# Patient Record
Sex: FEMALE | Race: WHITE | NOT HISPANIC OR LATINO | Employment: FULL TIME | URBAN - METROPOLITAN AREA
[De-identification: names, ages, dates, MRNs, and addresses within clinical notes are randomized per-mention and may not be internally consistent; named-entity substitution may affect disease eponyms.]

---

## 2017-10-04 ENCOUNTER — APPOINTMENT (OUTPATIENT)
Dept: MEDICAL GROUP | Facility: PHYSICIAN GROUP | Age: 27
End: 2017-10-04

## 2020-03-03 ENCOUNTER — HOSPITAL ENCOUNTER (EMERGENCY)
Facility: MEDICAL CENTER | Age: 30
End: 2020-03-03
Attending: EMERGENCY MEDICINE
Payer: COMMERCIAL

## 2020-03-03 ENCOUNTER — APPOINTMENT (OUTPATIENT)
Dept: RADIOLOGY | Facility: MEDICAL CENTER | Age: 30
End: 2020-03-03
Attending: EMERGENCY MEDICINE
Payer: COMMERCIAL

## 2020-03-03 VITALS
HEART RATE: 70 BPM | TEMPERATURE: 97.9 F | DIASTOLIC BLOOD PRESSURE: 65 MMHG | OXYGEN SATURATION: 98 % | SYSTOLIC BLOOD PRESSURE: 105 MMHG | HEIGHT: 68 IN | BODY MASS INDEX: 24.32 KG/M2 | WEIGHT: 160.5 LBS | RESPIRATION RATE: 18 BRPM

## 2020-03-03 DIAGNOSIS — K59.00 CONSTIPATION, UNSPECIFIED CONSTIPATION TYPE: ICD-10-CM

## 2020-03-03 DIAGNOSIS — R10.31 RIGHT LOWER QUADRANT ABDOMINAL PAIN: ICD-10-CM

## 2020-03-03 DIAGNOSIS — N83.201 CYST OF RIGHT OVARY: ICD-10-CM

## 2020-03-03 LAB
ALBUMIN SERPL BCP-MCNC: 4.3 G/DL (ref 3.2–4.9)
ALBUMIN/GLOB SERPL: 1.5 G/DL
ALP SERPL-CCNC: 35 U/L (ref 30–99)
ALT SERPL-CCNC: 11 U/L (ref 2–50)
ANION GAP SERPL CALC-SCNC: 11 MMOL/L (ref 7–16)
AST SERPL-CCNC: 17 U/L (ref 12–45)
BASOPHILS # BLD AUTO: 0.6 % (ref 0–1.8)
BASOPHILS # BLD: 0.05 K/UL (ref 0–0.12)
BILIRUB SERPL-MCNC: 0.4 MG/DL (ref 0.1–1.5)
BUN SERPL-MCNC: 16 MG/DL (ref 8–22)
CALCIUM SERPL-MCNC: 9.5 MG/DL (ref 8.4–10.2)
CHLORIDE SERPL-SCNC: 104 MMOL/L (ref 96–112)
CO2 SERPL-SCNC: 23 MMOL/L (ref 20–33)
CREAT SERPL-MCNC: 0.79 MG/DL (ref 0.5–1.4)
EOSINOPHIL # BLD AUTO: 0.25 K/UL (ref 0–0.51)
EOSINOPHIL NFR BLD: 2.9 % (ref 0–6.9)
ERYTHROCYTE [DISTWIDTH] IN BLOOD BY AUTOMATED COUNT: 41.9 FL (ref 35.9–50)
GLOBULIN SER CALC-MCNC: 2.8 G/DL (ref 1.9–3.5)
GLUCOSE SERPL-MCNC: 118 MG/DL (ref 65–99)
HCG SERPL QL: NEGATIVE
HCT VFR BLD AUTO: 37.9 % (ref 37–47)
HGB BLD-MCNC: 13.1 G/DL (ref 12–16)
IMM GRANULOCYTES # BLD AUTO: 0.02 K/UL (ref 0–0.11)
IMM GRANULOCYTES NFR BLD AUTO: 0.2 % (ref 0–0.9)
LACTATE BLD-SCNC: 1.2 MMOL/L (ref 0.5–2)
LIPASE SERPL-CCNC: 22 U/L (ref 7–58)
LYMPHOCYTES # BLD AUTO: 1.51 K/UL (ref 1–4.8)
LYMPHOCYTES NFR BLD: 17.2 % (ref 22–41)
MCH RBC QN AUTO: 31.6 PG (ref 27–33)
MCHC RBC AUTO-ENTMCNC: 34.6 G/DL (ref 33.6–35)
MCV RBC AUTO: 91.5 FL (ref 81.4–97.8)
MONOCYTES # BLD AUTO: 0.81 K/UL (ref 0–0.85)
MONOCYTES NFR BLD AUTO: 9.2 % (ref 0–13.4)
NEUTROPHILS # BLD AUTO: 6.12 K/UL (ref 2–7.15)
NEUTROPHILS NFR BLD: 69.9 % (ref 44–72)
NRBC # BLD AUTO: 0 K/UL
NRBC BLD-RTO: 0 /100 WBC
PLATELET # BLD AUTO: 267 K/UL (ref 164–446)
PMV BLD AUTO: 9.7 FL (ref 9–12.9)
POTASSIUM SERPL-SCNC: 3.8 MMOL/L (ref 3.6–5.5)
PROT SERPL-MCNC: 7.1 G/DL (ref 6–8.2)
RBC # BLD AUTO: 4.14 M/UL (ref 4.2–5.4)
SODIUM SERPL-SCNC: 138 MMOL/L (ref 135–145)
WBC # BLD AUTO: 8.8 K/UL (ref 4.8–10.8)

## 2020-03-03 PROCEDURE — 36415 COLL VENOUS BLD VENIPUNCTURE: CPT

## 2020-03-03 PROCEDURE — 83605 ASSAY OF LACTIC ACID: CPT

## 2020-03-03 PROCEDURE — 80053 COMPREHEN METABOLIC PANEL: CPT

## 2020-03-03 PROCEDURE — 74177 CT ABD & PELVIS W/CONTRAST: CPT

## 2020-03-03 PROCEDURE — 700111 HCHG RX REV CODE 636 W/ 250 OVERRIDE (IP): Performed by: EMERGENCY MEDICINE

## 2020-03-03 PROCEDURE — 99285 EMERGENCY DEPT VISIT HI MDM: CPT

## 2020-03-03 PROCEDURE — 83690 ASSAY OF LIPASE: CPT

## 2020-03-03 PROCEDURE — 76856 US EXAM PELVIC COMPLETE: CPT

## 2020-03-03 PROCEDURE — 85025 COMPLETE CBC W/AUTO DIFF WBC: CPT

## 2020-03-03 PROCEDURE — 700117 HCHG RX CONTRAST REV CODE 255: Performed by: EMERGENCY MEDICINE

## 2020-03-03 PROCEDURE — 96375 TX/PRO/DX INJ NEW DRUG ADDON: CPT

## 2020-03-03 PROCEDURE — 96374 THER/PROPH/DIAG INJ IV PUSH: CPT

## 2020-03-03 PROCEDURE — 84703 CHORIONIC GONADOTROPIN ASSAY: CPT

## 2020-03-03 RX ORDER — IBUPROFEN 200 MG
400 TABLET ORAL EVERY 6 HOURS PRN
COMMUNITY

## 2020-03-03 RX ORDER — MELOXICAM 7.5 MG/1
7.5 TABLET ORAL DAILY
Qty: 30 TAB | Refills: 0 | Status: SHIPPED | OUTPATIENT
Start: 2020-03-03

## 2020-03-03 RX ORDER — KETOROLAC TROMETHAMINE 30 MG/ML
15 INJECTION, SOLUTION INTRAMUSCULAR; INTRAVENOUS ONCE
Status: COMPLETED | OUTPATIENT
Start: 2020-03-03 | End: 2020-03-03

## 2020-03-03 RX ORDER — HYDROCODONE BITARTRATE AND ACETAMINOPHEN 5; 325 MG/1; MG/1
1-2 TABLET ORAL EVERY 4 HOURS PRN
Qty: 19 TAB | Refills: 0 | Status: SHIPPED | OUTPATIENT
Start: 2020-03-03 | End: 2020-03-05

## 2020-03-03 RX ORDER — M-VIT,TX,IRON,MINS/CALC/FOLIC 27MG-0.4MG
1 TABLET ORAL DAILY
COMMUNITY

## 2020-03-03 RX ORDER — ONDANSETRON 2 MG/ML
4 INJECTION INTRAMUSCULAR; INTRAVENOUS ONCE
Status: COMPLETED | OUTPATIENT
Start: 2020-03-03 | End: 2020-03-03

## 2020-03-03 RX ORDER — MORPHINE SULFATE 4 MG/ML
4 INJECTION, SOLUTION INTRAMUSCULAR; INTRAVENOUS ONCE
Status: COMPLETED | OUTPATIENT
Start: 2020-03-03 | End: 2020-03-03

## 2020-03-03 RX ADMIN — MORPHINE SULFATE 4 MG: 4 INJECTION INTRAVENOUS at 07:33

## 2020-03-03 RX ADMIN — ONDANSETRON 4 MG: 2 INJECTION INTRAMUSCULAR; INTRAVENOUS at 07:33

## 2020-03-03 RX ADMIN — IOHEXOL 100 ML: 350 INJECTION, SOLUTION INTRAVENOUS at 06:47

## 2020-03-03 RX ADMIN — KETOROLAC TROMETHAMINE 15 MG: 30 INJECTION, SOLUTION INTRAMUSCULAR at 05:33

## 2020-03-03 SDOH — HEALTH STABILITY: MENTAL HEALTH: HOW OFTEN DO YOU HAVE A DRINK CONTAINING ALCOHOL?: NEVER

## 2020-03-03 ASSESSMENT — PAIN SCALES - WONG BAKER: WONGBAKER_NUMERICALRESPONSE: HURTS A WHOLE LOT

## 2020-03-03 NOTE — ED TRIAGE NOTES
"Chief Complaint   Patient presents with   • Abdominal Pain      RLQ pain since yesterday afternoon.     BP (!) 95/54   Pulse 67   Temp 36.6 °C (97.9 °F) (Temporal)   Resp 18   Ht 1.727 m (5' 8\")   Wt 72.8 kg (160 lb 7.9 oz)   LMP 02/03/2020 (Approximate)   SpO2 98%   Breastfeeding No   BMI 24.40 kg/m²     "

## 2020-03-03 NOTE — ED PROVIDER NOTES
ED Provider Note    I assumed care of this patient from Dr. Parsons.  Please see his note for details.  She was awaiting laboratory and CT of the abdomen results.  She was here for evaluation of right lower quadrant abdominal pain.  CT scan demonstrated a right lower quadrant cystic structure but no evidence of appendicitis.  Her white blood cell count is in fact normal.  At that point a pelvic ultrasound is obtained and the results are listed below.    US-PELVIC TRANSABDOMINAL ONLY   Final Result      Unremarkable uterus.      A 5.1 x 3.7 x 3.5 cm hemorrhagic cyst in the right ovary.      CT-ABDOMEN-PELVIS WITH   Final Result      1.  Right pelvic cystic structure, likely ovarian origin.   No acute abdominal or pelvic findings.   Nonvisualized appendix.            I discussed the results of the studies with the patient and her mother.  I believe her symptoms are from her ovarian cyst.  On her CT scan she does have quite a lot of stool in her colon as well and for that reason I will provide her a prescription for mag citrate.  I will start her on meloxicam and provide her a prescription for [place].  I reviewed her prescription monitoring report and she will sign a consent for treatment with narcotics.  I referred her to GYN for follow-up.  If she has worsening symptoms she should go to [facility] where they have GYN on-call.  She is given a discharge instruction sheet on abdominal pain, ovarian cysts, and constipation.    Impression  1.  Right lower quadrant abdominal pain  2.  Right ovarian cyst  3.  Constipation

## 2020-03-03 NOTE — DISCHARGE INSTRUCTIONS
You were seen in the emergency department for abdominal pain. Your labs and physical exam were reassuring. Your imaging was also reassuring.  At this time, your pain does not appear to be dangerous.     Please be alert for signs of possible infection, including worsening pain, vomiting, fevers, chills. If these develop, please return to emergency department or seek medical care.

## 2020-03-03 NOTE — ED NOTES
0705 Report received from [facility]  , assumed care at this time  0720 pt re evaluated by md, new orders received

## 2020-03-03 NOTE — ED PROVIDER NOTES
ED Provider Note      Means of Arrival: Private vehicle  History obtained from: Patient      CHIEF COMPLAINT  Chief Complaint   Patient presents with   • Abdominal Pain      RLQ pain since yesterday afternoon.       HPI  Ave Ewing is a 29 y.o. female who presents with right lower quadrant abdominal pain.  Patient reports that she had onset of discomfort approximate 2 PM yesterday.  It markedly worsened around 4 PM while she was sitting at her eye doctor's office.  She denies any heavy lifting or other triggering events.  The pain has been generally constant, however worsening.  There have been some episodes with reduced pain but has never gone away since the onset.  She did report mild difficulty with eating dinner and the sensation that she might vomit last night, however has no ongoing nausea.  She denies any aggravating factors other than standing up straight.  It is alleviated by bending forward.  It worsened throughout the night.  She took an ibuprofen with some relief several hours ago.  She denies any vaginal discharge, hematuria, dysuria, diarrhea, vomiting.  She denies any migration of the pain.  Patient states she is not pregnant.    REVIEW OF SYSTEMS  CONSTITUTIONAL:  No fever.  CARDIOVASCULAR:  No chest discomfort.  RESPIRATORY:  No pleuritic chest pain.  GASTROINTESTINAL:   See HPI  GENITOURINARY:   No dysuria.  MUSCULOSKELETAL:  No arthralgia.  SKIN:  No rash or suspicious lesions.  NEUROLOGIC:   No headache.  ENDOCRINE:  No facial edema.  HEMATOLOGIC:  No abnormal bleeding.       See HPI for further details.   All other systems are negative.     PAST MEDICAL HISTORY  History reviewed. No pertinent past medical history.    FAMILY HISTORY  History reviewed. No pertinent family history.    SOCIAL HISTORY   reports that she has never smoked. She has never used smokeless tobacco. She reports that she does not drink alcohol or use drugs.    SURGICAL HISTORY  History reviewed. No pertinent  "surgical history.    CURRENT MEDICATIONS  Home Medications     Reviewed by Ariadne Romo R.N. (Registered Nurse) on 03/03/20 at 0523  Med List Status: Not Addressed   Medication Last Dose Status   therapeutic multivitamin-minerals (THERAGRAN-M) Tab 3/2/2020 Active                ALLERGIES  No Known Allergies    PHYSICAL EXAM  VITAL SIGNS: BP (!) 95/54   Pulse 67   Temp 36.6 °C (97.9 °F) (Temporal)   Resp 18   Ht 1.727 m (5' 8\")   Wt 72.8 kg (160 lb 7.9 oz)   LMP 02/03/2020 (Approximate)   SpO2 98%   Breastfeeding No   BMI 24.40 kg/m²    Gen: Alert  HENT: ATNC  Eyes: Normal conjunctiva  Neck: trachea midline  Resp: no respiratory distress  CV: No JVD, regular rate and rhythm  Abd: non-distended, tenderness in the right lower quadrant, mild Rovsing sign.  No rebound or guarding.  No other abdominal tenderness.  Ext: No deformities  Psych: normal mood  Neuro: speech fluent       RADIOLOGY/PROCEDURES  CT-ABDOMEN-PELVIS WITH    (Results Pending)       LABS  Labs Reviewed   CBC WITH DIFFERENTIAL - Abnormal; Notable for the following components:       Result Value    RBC 4.14 (*)     Lymphocytes 17.20 (*)     All other components within normal limits   COMP METABOLIC PANEL   LIPASE   LACTIC ACID   LACTIC ACID   URINALYSIS,CULTURE IF INDICATED   HCG QUAL SERUM          COURSE & MEDICAL DECISION MAKING  Pertinent Labs & Imaging studies reviewed. (See chart for details)    Patient resents with right-sided lower abdominal pain, sharp in nature.  This is reproducible on palpation.  Signs and symptoms do not appear to be consistent with bowel obstruction, ureterolithiasis.  Will perform labs to evaluate for urinary tract involvement.  Obtain CAT scan to evaluate for appendicitis.  Patient denies sexual activity, denies vaginal discharge, low suspicion for PID.  No leukocytosis.    0600: Patient's work-up will not be complete during my shift.  Patient be signed out to Dr. Gottlieb awaiting results of the work-up and " further evaluation and testing as needed.        FINAL IMPRESSION  1. Right lower quadrant abdominal pain

## 2020-03-13 ENCOUNTER — GYNECOLOGY VISIT (OUTPATIENT)
Dept: OBGYN | Facility: CLINIC | Age: 30
End: 2020-03-13
Payer: COMMERCIAL

## 2020-03-13 VITALS
WEIGHT: 165 LBS | SYSTOLIC BLOOD PRESSURE: 137 MMHG | DIASTOLIC BLOOD PRESSURE: 79 MMHG | HEART RATE: 77 BPM | BODY MASS INDEX: 25.09 KG/M2

## 2020-03-13 DIAGNOSIS — R10.2 PELVIC PAIN IN FEMALE: ICD-10-CM

## 2020-03-13 DIAGNOSIS — N83.201 CYST OF RIGHT OVARY: ICD-10-CM

## 2020-03-13 PROCEDURE — 99203 OFFICE O/P NEW LOW 30 MIN: CPT | Performed by: OBSTETRICS & GYNECOLOGY

## 2020-03-13 ASSESSMENT — FIBROSIS 4 INDEX: FIB4 SCORE: 0.58

## 2020-03-13 NOTE — NON-PROVIDER
Pt here for NP ED f/u visit  Pt states was seen in ED for cyst on Right side  Good#[phone]  Pharmacy verified

## 2020-03-13 NOTE — PROGRESS NOTES
Subjective:      Ave Ewing is a 29 y.o. female who presents as New Patient            HPI patient is a 29-year-old  0 who presents today for follow-up from the emergency room.  She states that on 3/3/2020, she started to experience some right lower quadrant pelvic and abdominal pain which progressively worsened as the hours passed and she eventually was experiencing excruciating pain prompting ER visit.  She had CT and ultrasound studies and was told that she has ovarian cyst and she needs follow-up.  Patient states pain symptoms have been improving.  She started her expected menstrual bleeding yesterday.  Patient reports normal bowel and bladder functions.  Denies any fevers or dysuria.  She states pain is still there and does not radiate but has improved a lot.    Patient is not sexually active.  She is not on any contraception does not desire contraception    ROS all organ systems were reviewed and were negative except for complaints in HPI       Objective:     /79   Pulse 77   Wt 74.8 kg (165 lb)   BMI 25.09 kg/m²      Physical Exam  Vitals signs and nursing note reviewed. Exam conducted with a chaperone present.   Constitutional:       General: She is not in acute distress.     Appearance: Normal appearance. She is normal weight. She is not toxic-appearing.   HENT:      Head: Normocephalic and atraumatic.   Neck:      Musculoskeletal: Normal range of motion and neck supple. No neck rigidity.   Cardiovascular:      Rate and Rhythm: Normal rate and regular rhythm.      Pulses: Normal pulses.      Heart sounds: Normal heart sounds. No murmur. No gallop.    Pulmonary:      Effort: Pulmonary effort is normal. No respiratory distress.      Breath sounds: Normal breath sounds. No wheezing.   Abdominal:      General: Abdomen is flat. Bowel sounds are normal. There is no distension.      Palpations: Abdomen is soft. There is no mass.      Tenderness: There is abdominal tenderness (Moderate  tenderness to palpation right lower quadrant). There is no right CVA tenderness, left CVA tenderness or guarding.      Hernia: No hernia is present.   Genitourinary:     General: Normal vulva.      Labia:         Right: No rash, tenderness, lesion or injury.         Left: No rash, tenderness, lesion or injury.       Vagina: Bleeding (Scant amount of dark blood) present. No vaginal discharge.      Cervix: No cervical motion tenderness, discharge, friability, lesion or erythema.      Uterus: Not enlarged and not tender.       Adnexa:         Right: Tenderness and fullness present.         Left: No mass, tenderness or fullness.        Rectum: No external hemorrhoid.   Musculoskeletal: Normal range of motion.      Right lower leg: No edema.      Left lower leg: No edema.   Lymphadenopathy:      Cervical: No cervical adenopathy.      Lower Body: No right inguinal adenopathy.   Skin:     General: Skin is warm and dry.      Coloration: Skin is not jaundiced.   Neurological:      General: No focal deficit present.      Mental Status: She is alert and oriented to person, place, and time.      Motor: No weakness.      Gait: Gait normal.   Psychiatric:         Mood and Affect: Mood normal.         Behavior: Behavior normal.         Thought Content: Thought content normal.         Judgment: Judgment normal.          Discussion:    I reviewed the labs, CT scan and ultrasound results with patient.  Patient has a right hemorrhagic ovarian cyst and patient was reassured.  We discussed that we can repeat evaluation in about 6 weeks to make sure that the cyst has resolved since it is a large size and patient agrees.  She will return to office for ultrasound.  I discussed pain management including ibuprofen and heat therapy.  Torsion precautions were reviewed     Assessment/Plan:       1. Pelvic pain in female  29-year-old  0 with right-sided pelvic pain likely due to large hemorrhagic cyst.  Patient counseled on findings.   Pain management reviewed.  Torsion precautions reviewed.  Patient to follow-up in 6 weeks for repeat ultrasound    2. Cyst of right ovary  Findings reviewed and imaging    3.  Precautions and plan of care reviewed    Patient requested a work note to return to work next Tuesday.  Note given

## 2020-03-13 NOTE — LETTER
March 13, 2020          To Whom It May Concern:         This is confirmation that Ave Ewing attended her scheduled appointment with Sidney Potts M.D. on 3/13/20. She can resume full activities in 3/17/2020 without restrictions         If you have any questions please do not hesitate to call me at the phone number listed below.    Sincerely,          Sidney Potts M.D., FACOG  [phone]

## 2020-04-16 DIAGNOSIS — N83.209 CYST OF OVARY, UNSPECIFIED LATERALITY: ICD-10-CM
